# Patient Record
Sex: FEMALE
[De-identification: names, ages, dates, MRNs, and addresses within clinical notes are randomized per-mention and may not be internally consistent; named-entity substitution may affect disease eponyms.]

---

## 2021-05-17 ENCOUNTER — APPOINTMENT (OUTPATIENT)
Dept: ENDOCRINOLOGY | Facility: CLINIC | Age: 21
End: 2021-05-17
Payer: MEDICAID

## 2021-05-17 VITALS
HEART RATE: 103 BPM | BODY MASS INDEX: 26.66 KG/M2 | DIASTOLIC BLOOD PRESSURE: 70 MMHG | SYSTOLIC BLOOD PRESSURE: 120 MMHG | HEIGHT: 65 IN | WEIGHT: 160 LBS

## 2021-05-17 DIAGNOSIS — N92.0 EXCESSIVE AND FREQUENT MENSTRUATION WITH REGULAR CYCLE: ICD-10-CM

## 2021-05-17 PROBLEM — Z00.00 ENCOUNTER FOR PREVENTIVE HEALTH EXAMINATION: Status: ACTIVE | Noted: 2021-05-17

## 2021-05-17 PROCEDURE — 99072 ADDL SUPL MATRL&STAF TM PHE: CPT

## 2021-05-17 PROCEDURE — 99205 OFFICE O/P NEW HI 60 MIN: CPT

## 2021-05-17 RX ORDER — ERGOCALCIFEROL 1.25 MG/1
1.25 MG CAPSULE, LIQUID FILLED ORAL
Qty: 4 | Refills: 0 | Status: ACTIVE | COMMUNITY
Start: 2021-04-15

## 2021-05-17 RX ORDER — ACETAMINOPHEN AND CODEINE 300; 30 MG/1; MG/1
300-30 TABLET ORAL
Qty: 20 | Refills: 0 | Status: ACTIVE | COMMUNITY
Start: 2021-03-25

## 2021-05-17 RX ORDER — AMOXICILLIN 500 MG/1
500 CAPSULE ORAL
Qty: 21 | Refills: 0 | Status: ACTIVE | COMMUNITY
Start: 2021-03-25

## 2021-05-17 RX ORDER — IBUPROFEN 600 MG/1
600 TABLET, FILM COATED ORAL
Qty: 20 | Refills: 0 | Status: ACTIVE | COMMUNITY
Start: 2021-03-25

## 2021-05-17 RX ORDER — LISINOPRIL 20 MG/1
20 TABLET ORAL
Qty: 30 | Refills: 0 | Status: ACTIVE | COMMUNITY
Start: 2021-05-15

## 2021-05-17 NOTE — HISTORY OF PRESENT ILLNESS
[FreeTextEntry1] : 21 y/o F w/ Hx of HTN\par here for initial evaluation and management hirsutism\par generally feels well and endorses no acute complaints.\par reports symptoms started at age 14, adrenarche/menarche were at age 7\par reports no improvement w/ laser. has never tried other treatments. has never had endocrine evaluation. diagnosed w/ HTN at age 13, told it may be hereditary at the time. family hx + for breast Ca.\par She otherwise denies any f/c, CP, SOB, palpitations, tremors, depressed mood, anxiety, palpitations, n/v, stool/urinary abn, skin/weight changes, heat/cold intolerance, HAs, breast/nipple changes, polyuria/polydipsia/nocturia or other complaints.\par states periods are heavy, last ~6 days, a/b cramps but mostly regular.\par

## 2021-05-17 NOTE — PHYSICAL EXAM
[Alert] : alert [Well Nourished] : well nourished [No Acute Distress] : no acute distress [Well Developed] : well developed [Normal Sclera/Conjunctiva] : normal sclera/conjunctiva [EOMI] : extra ocular movement intact [No Proptosis] : no proptosis [Normal Oropharynx] : the oropharynx was normal [Thyroid Not Enlarged] : the thyroid was not enlarged [No Thyroid Nodules] : no palpable thyroid nodules [No Respiratory Distress] : no respiratory distress [No Accessory Muscle Use] : no accessory muscle use [Clear to Auscultation] : lungs were clear to auscultation bilaterally [Normal S1, S2] : normal S1 and S2 [Normal Rate] : heart rate was normal [Regular Rhythm] : with a regular rhythm [No Edema] : no peripheral edema [Pedal Pulses Normal] : the pedal pulses are present [Normal Bowel Sounds] : normal bowel sounds [Not Tender] : non-tender [Not Distended] : not distended [Soft] : abdomen soft [Normal Anterior Cervical Nodes] : no anterior cervical lymphadenopathy [Normal Posterior Cervical Nodes] : no posterior cervical lymphadenopathy [No Spinal Tenderness] : no spinal tenderness [Spine Straight] : spine straight [No Stigmata of Cushings Syndrome] : no stigmata of Cushings Syndrome [Normal Gait] : normal gait [Normal Strength/Tone] : muscle strength and tone were normal [No Rash] : no rash [Acanthosis Nigricans] : no acanthosis nigricans [Normal Reflexes] : deep tendon reflexes were 2+ and symmetric [No Tremors] : no tremors [Oriented x3] : oriented to person, place, and time [de-identified] : ferrimaaggie Gonzalesewchapis score >15

## 2021-05-17 NOTE — ASSESSMENT
[FreeTextEntry1] : hirsutism\par pt w/ FG score >15, regular periods, reviewed pathophysiology. r/o hormonal conditions like CAH or pituiary adrenal disorders. treatment options reviewed, will discuss non hormonal effective birth control w/ GYN\par \par HTN\par r/o 2ry causes given early onset.

## 2021-06-28 ENCOUNTER — APPOINTMENT (OUTPATIENT)
Dept: ENDOCRINOLOGY | Facility: CLINIC | Age: 21
End: 2021-06-28
Payer: MEDICAID

## 2021-06-28 VITALS
HEIGHT: 65 IN | DIASTOLIC BLOOD PRESSURE: 80 MMHG | HEART RATE: 99 BPM | SYSTOLIC BLOOD PRESSURE: 120 MMHG | BODY MASS INDEX: 26.66 KG/M2 | WEIGHT: 160 LBS

## 2021-06-28 DIAGNOSIS — L68.0 HIRSUTISM: ICD-10-CM

## 2021-06-28 DIAGNOSIS — I10 ESSENTIAL (PRIMARY) HYPERTENSION: ICD-10-CM

## 2021-06-28 DIAGNOSIS — Z78.9 OTHER SPECIFIED HEALTH STATUS: ICD-10-CM

## 2021-06-28 DIAGNOSIS — F12.90 CANNABIS USE, UNSPECIFIED, UNCOMPLICATED: ICD-10-CM

## 2021-06-28 DIAGNOSIS — E22.1 HYPERPROLACTINEMIA: ICD-10-CM

## 2021-06-28 PROCEDURE — 99215 OFFICE O/P EST HI 40 MIN: CPT

## 2021-06-28 RX ORDER — CABERGOLINE 0.5 MG/1
0.5 TABLET ORAL
Qty: 12 | Refills: 3 | Status: ACTIVE | COMMUNITY
Start: 2021-06-28 | End: 1900-01-01

## 2021-06-28 NOTE — HISTORY OF PRESENT ILLNESS
[FreeTextEntry1] : 21 y/o F w/ Hx of HTN\par initial evaluation and management hirsutism\par generally feels well and endorses no acute complaints.\par reports symptoms started at age 14, adrenarche/menarche were at age 7\par reports no improvement w/ laser. has never tried other treatments. has never had endocrine evaluation. diagnosed w/ HTN at age 13, told it may be hereditary at the time. family hx + for breast Ca.\par \par 6/2021 Here for /fu, generally feels well and endorses no acute complaints. No interval events since LV. Today comes to review labs, reports unchanged symptoms.\par She otherwise denies any f/c, CP, SOB, palpitations, tremors, depressed mood, anxiety, palpitations, n/v, stool/urinary abn, skin/weight changes, heat/cold intolerance, HAs, breast/nipple changes, polyuria/polydipsia/nocturia or other complaints.\par states periods are heavy, last ~6 days, a/b cramps but mostly regular.\par

## 2021-06-28 NOTE — PHYSICAL EXAM
[Alert] : alert [Well Nourished] : well nourished [No Acute Distress] : no acute distress [Well Developed] : well developed [Normal Sclera/Conjunctiva] : normal sclera/conjunctiva [EOMI] : extra ocular movement intact [No Proptosis] : no proptosis [Normal Oropharynx] : the oropharynx was normal [Thyroid Not Enlarged] : the thyroid was not enlarged [No Thyroid Nodules] : no palpable thyroid nodules [No Respiratory Distress] : no respiratory distress [No Accessory Muscle Use] : no accessory muscle use [Clear to Auscultation] : lungs were clear to auscultation bilaterally [Normal S1, S2] : normal S1 and S2 [Normal Rate] : heart rate was normal [Regular Rhythm] : with a regular rhythm [No Edema] : no peripheral edema [Pedal Pulses Normal] : the pedal pulses are present [Normal Bowel Sounds] : normal bowel sounds [Not Tender] : non-tender [Not Distended] : not distended [Soft] : abdomen soft [Normal Anterior Cervical Nodes] : no anterior cervical lymphadenopathy [No Spinal Tenderness] : no spinal tenderness [Spine Straight] : spine straight [No Stigmata of Cushings Syndrome] : no stigmata of Cushings Syndrome [Normal Gait] : normal gait [Normal Strength/Tone] : muscle strength and tone were normal [No Rash] : no rash [Acanthosis Nigricans] : no acanthosis nigricans [Normal Reflexes] : deep tendon reflexes were 2+ and symmetric [No Tremors] : no tremors [Oriented x3] : oriented to person, place, and time [de-identified] : ferrimaaggie Gonzalesewchapis score >15

## 2021-06-28 NOTE — ASSESSMENT
[FreeTextEntry1] : hirsutism\par pt w/ FG score >15, regular periods, reviewed pathophysiology. PRL elevation at 49, no s/s of intracranial mass effect, free testosterone elevated, DHEAs and 17-OH wnl. refer for repeat macroprolactin, advised to stop CBD consumption, refer for pitiutary MRI. start DA afterwards. \par \par HTN\par neg labs, not suggestive of hypercortisolism or 1ry aldosteronism\par \par \par HM\par reviewed transaminitis, reports binge drinking behavior, advised on reducing etoh intake and discussing interventions for cessation w/ PCP. Verbalized understanding and agrees with treatment plan, will contact MD and seek emergency medical care if condition changes.\par